# Patient Record
(demographics unavailable — no encounter records)

---

## 2024-12-16 NOTE — PHYSICAL EXAM
[Alert] : alert [Oriented to Person] : oriented to person [Oriented to Place] : oriented to place [Oriented to Time] : oriented to time [Calm] : calm [de-identified] : He  is alert, well-groomed, and in NAD   [de-identified] : anicteric.  Nasal mucosa pink, septum midline. Oral mucosa pink.  Tongue midline, Pharynx without exudates.   [de-identified] :  reducible  incisional hernia, mildly tender.  The defect appears to be relatively small, less than 3 cm and the skin overlying the hernia is thin and pinkish

## 2024-12-16 NOTE — HISTORY OF PRESENT ILLNESS
[de-identified] : Mr. ETHAN TELLO is a 42 year  old male is presenting  with the chief complaint of having an umbilical hernia.  He reports having this condition for 4 years. He denies any trauma to the area, fever, nausea, vomiting, distension, night sweats and  loss of appetite.  Symptoms aggravated by cough and straining.  - He reports normal bowel movements.   -He  reports laparoscopic  appendectomy in 2018. he states that the hernia popped out 1 week after the surgery.

## 2024-12-16 NOTE — PLAN
[FreeTextEntry1] : Mr. TELLO  was told significance of findings, options, risks and benefits were explained.  Informed consent for incisional hernia  repair , and potential risks, benefits and alternatives (surgical options were discussed including non-surgical options or the option of no surgery) to the planned surgery were discussed in depth.  All surgical options were discussed including non-surgical treatments.  He wishes to proceed with surgery.  We will plan for surgery on at the next available date, pending any required insurance pre-certification or pre-approval. He agrees to obtain any necessary pre-operative evaluations and testing prior to surgery. Patient advised to seek immediate medical attention with any acute change in symptoms or with the development of any new or worsening symptoms.  Patient's questions and concerns addressed to patient's satisfaction, and patient verbalized an understanding of the information discussed.

## 2025-05-08 NOTE — PHYSICAL EXAM
[Alert] : alert [Oriented to Person] : oriented to person [Oriented to Place] : oriented to place [Oriented to Time] : oriented to time [Calm] : calm [de-identified] : He  is alert, well-groomed, and in NAD   [de-identified] : anicteric.  Nasal mucosa pink, septum midline. Oral mucosa pink.  Tongue midline, Pharynx without exudates.   [de-identified] :  Surgical wounds are  healing well.   no signs of  inflammation or infection.

## 2025-05-08 NOTE — PHYSICAL EXAM
[Alert] : alert [Oriented to Person] : oriented to person [Oriented to Place] : oriented to place [Oriented to Time] : oriented to time [Calm] : calm [de-identified] : He  is alert, well-groomed, and in NAD   [de-identified] : anicteric.  Nasal mucosa pink, septum midline. Oral mucosa pink.  Tongue midline, Pharynx without exudates.   [de-identified] :  Surgical wounds are  healing well.   no signs of  inflammation or infection.

## 2025-05-08 NOTE — HISTORY OF PRESENT ILLNESS
[de-identified] : Mr. TELLO  is s/p incisional hernia repair on 04/25/2025.  Today  Mr. TELLO offers no complaints. patient reports no fever, chills,  or  pain.  His surgical wound is healing well. No signs of inflammation, infection or exudate.   Patient reports good bowel movements and appetite.

## 2025-05-08 NOTE — ASSESSMENT
[FreeTextEntry1] : Mr. TELLO is doing well, with excellent post-operative recovery. All surgical incisions are healing well and as expected. There is no evidence of infection or complication, and he is progressing as expected. Post-operative wound care, activity, restrictions and precautions reinforced. Patient instructed to refrain from any heavy lifting greater than 10-15 pounds for at least 4-6 weeks post-operatively. Patient's questions and concerns addressed to patient's satisfaction.

## 2025-05-08 NOTE — HISTORY OF PRESENT ILLNESS
[de-identified] : Mr. TELLO  is s/p incisional hernia repair on 04/25/2025.  Today  Mr. TELLO offers no complaints. patient reports no fever, chills,  or  pain.  His surgical wound is healing well. No signs of inflammation, infection or exudate.   Patient reports good bowel movements and appetite.